# Patient Record
(demographics unavailable — no encounter records)

---

## 2024-11-14 NOTE — IMAGING
[Bilateral] :  tibia/fibula bilaterally [There are no fractures, subluxations or dislocations. No significant abnormalities are seen] : There are no fractures, subluxations or dislocations. No significant abnormalities are seen [Facet arthropathy] : Facet arthropathy [Disc space narrowing] : Disc space narrowing [FreeTextEntry1] : multilevel stenosis

## 2024-11-14 NOTE — HISTORY OF PRESENT ILLNESS
[Left Leg] : left leg [Right Leg] : right leg [Dull/Aching] : dull/aching [Localized] : localized [de-identified] : Took a course of Levaquin, developed pain both calves down to feet. Has been off it now for 2 weeks, but the pain persists. Has had  back issues in the past, no back pain at present.  [] : no [FreeTextEntry5] : b/l legs (calves) pain that developed after completely ENT meds one week ago. He took levofloxacin.

## 2024-11-14 NOTE — PHYSICAL EXAM
[Flexion] : flexion [Extension] : extension [Bending to left] : bending to left [Bending to right] : bending to right [Bilateral] : foot and ankle bilaterally [] : negative sitting straight leg raise [FreeTextEntry8] : calves soft, Larry's negative

## 2024-12-05 NOTE — HISTORY OF PRESENT ILLNESS
[Left Leg] : left leg [Right Leg] : right leg [Dull/Aching] : dull/aching [Localized] : localized [de-identified] : 12/05/2024 Calf pains resolved with MDP and PT, feels great  Took a course of Levaquin, developed pain both calves down to feet. Has been off it now for 2 weeks, but the pain persists. Has had  back issues in the past, no back pain at present.  [] : no [FreeTextEntry5] : b/l legs (calves) pain that developed after completely ENT meds one week ago. He took levofloxacin.

## 2024-12-05 NOTE — PHYSICAL EXAM
[Flexion] : flexion [Extension] : extension [Bending to left] : bending to left [Bending to right] : bending to right [Bilateral] : foot and ankle bilaterally [] : no calf tenderness

## 2025-06-03 NOTE — PHYSICAL EXAM
[Right] : right shoulder [Left] : left shoulder [] : tenderness at anterior shoulder [TWNoteComboBox7] : active forward flexion 120 degrees [de-identified] : active abduction 105 degrees

## 2025-06-03 NOTE — PROCEDURE
[Large Joint Injection] : Large joint injection [Right] : of the right [Glenohumeral Joint] : glenohumeral joint [Pain] : pain [Alcohol] : alcohol [Betadine] : betadine [Ethyl Chloride sprayed topically] : ethyl chloride sprayed topically [Sterile technique used] : sterile technique used [___ cc    3mg] :  Betamethasone (Celestone) ~Vcc of 3mg [___ cc    1%] : Lidocaine ~Vcc of 1%  [] : Patient tolerated procedure well [Call if redness, pain or fever occur] : call if redness, pain or fever occur [Apply ice for 15min out of every hour for the next 12-24 hours as tolerated] : apply ice for 15 minutes out of every hour for the next 12-24 hours as tolerated [Patient was advised to rest the joint(s) for ____ days] : patient was advised to rest the joint(s) for [unfilled] days [Previous OTC use and PT nontherapeutic] : patient has tried OTC's including aspirin, Ibuprofen, Aleve, etc or prescription NSAIDS, and/or exercises at home and/or physical therapy without satisfactory response [Patient had decreased mobility in the joint] : patient had decreased mobility in the joint [Risks, benefits, alternatives discussed / Verbal consent obtained] : the risks benefits, and alternatives have been discussed, and verbal consent was obtained

## 2025-06-03 NOTE — HISTORY OF PRESENT ILLNESS
[Left Leg] : left leg [Right Leg] : right leg [Dull/Aching] : dull/aching [Localized] : localized [de-identified] : 06/03/2025 Has worsening pain right shoulder, wants to try CSI 6/17/24: 82yo M with bilateral R>L neck and shoulder pain/stiffness for the past few months with no injury. Right shoulder is the worst of it. He has tried heat and Tylenol with mild relief. Notes stiffness is worse in the morning. Occasional pain that radiates down his arm; denies N/T.  [Gradual] : gradual [Radiating] : radiating [Sharp] : sharp [Tightness] : tightness [] : no [FreeTextEntry1] : b/l shoulders/nk  [FreeTextEntry5] : no injury, patient is feeling increased pain and stiffness in nk and into shoulders  [de-identified] : in the morning